# Patient Record
Sex: FEMALE | Race: OTHER | NOT HISPANIC OR LATINO | URBAN - METROPOLITAN AREA
[De-identification: names, ages, dates, MRNs, and addresses within clinical notes are randomized per-mention and may not be internally consistent; named-entity substitution may affect disease eponyms.]

---

## 2017-05-04 ENCOUNTER — APPOINTMENT (RX ONLY)
Dept: URBAN - METROPOLITAN AREA TELEMEDICINE 2 | Facility: TELEMEDICINE | Age: 42
Setting detail: DERMATOLOGY
End: 2017-05-04

## 2017-05-04 DIAGNOSIS — Z41.9 ENCOUNTER FOR PROCEDURE FOR PURPOSES OTHER THAN REMEDYING HEALTH STATE, UNSPECIFIED: ICD-10-CM

## 2017-05-04 PROBLEM — K21.9 GASTRO-ESOPHAGEAL REFLUX DISEASE WITHOUT ESOPHAGITIS: Status: ACTIVE | Noted: 2017-05-04

## 2017-05-04 PROBLEM — J45.909 UNSPECIFIED ASTHMA, UNCOMPLICATED: Status: ACTIVE | Noted: 2017-05-04

## 2017-05-04 PROCEDURE — ? COSMETIC CONSULTATION: BOTULINUM TOXIN

## 2017-05-04 PROCEDURE — ? BOTOX

## 2017-05-04 PROCEDURE — ? OTHER (COSMETIC)

## 2017-05-04 ASSESSMENT — LOCATION ZONE DERM: LOCATION ZONE: FACE

## 2017-05-04 ASSESSMENT — LOCATION SIMPLE DESCRIPTION DERM: LOCATION SIMPLE: LEFT FOREHEAD

## 2017-05-04 ASSESSMENT — LOCATION DETAILED DESCRIPTION DERM
LOCATION DETAILED: LEFT MEDIAL FOREHEAD
LOCATION DETAILED: LEFT INFERIOR MEDIAL FOREHEAD

## 2017-05-04 NOTE — PROCEDURE: BOTOX
Price (Use Numbers Only, No Special Characters Or $): 320
Additional Area 5 Units: 0
Glabellar Complex Units: 16
Post-Care Instructions: Patient instructed to not lie down for 4 hours and limit physical activity for 24 hours. Patient instructed not to travel by airplane for 48 hours.
Additional Area 1 Location: Brows
Forehead Units: 4
Additional Area 2 Location: Lower lids
Detail Level: Detailed
Consent: Written consent obtained. Risks include but not limited to lid/brow ptosis, bruising, swelling, diplopia, temporary effect, incomplete chemical denervation.
Reconstitution Date (Optional): 05/04/2017
Dilution (U/0.1 Cc): 5
Lot #: [id number]
Expiration Date (Month Year): 11/2019

## 2017-05-04 NOTE — PROCEDURE: OTHER (COSMETIC)
Other (Free Text): Went lighter on units per pt request \\n\\nDiscussed avoiding forehead above lateral brows to avoid eyebrow ptosis and avoid further worsening eyelid ptosis \\n\\nDiscussed 16 units to periorbital at follow up
Detail Level: Zone

## 2017-05-19 ENCOUNTER — APPOINTMENT (RX ONLY)
Dept: URBAN - METROPOLITAN AREA TELEMEDICINE 2 | Facility: TELEMEDICINE | Age: 42
Setting detail: DERMATOLOGY
End: 2017-05-19

## 2017-05-19 DIAGNOSIS — Z41.9 ENCOUNTER FOR PROCEDURE FOR PURPOSES OTHER THAN REMEDYING HEALTH STATE, UNSPECIFIED: ICD-10-CM

## 2017-05-19 PROCEDURE — ? BOTOX

## 2017-05-19 PROCEDURE — ? COSMETIC FOLLOW-UP

## 2017-05-19 PROCEDURE — ? COSMETIC CONSULTATION: BOTULINUM TOXIN

## 2017-05-19 PROCEDURE — ? RECOMMENDATIONS

## 2017-05-19 NOTE — PROCEDURE: BOTOX
Lateral Platysmal Bands Units: 0
Consent: Written consent obtained. Risks include but not limited to lid/brow ptosis, bruising, swelling, diplopia, temporary effect, incomplete chemical denervation.
Price (Use Numbers Only, No Special Characters Or $): 208
Detail Level: Detailed
Expiration Date (Month Year): 9/2018
Post-Care Instructions: Patient instructed to not lie down for 4 hours and limit physical activity for 24 hours. Patient instructed not to travel by airplane for 48 hours.
Additional Area 1 Location: Brows
Dilution (U/0.1 Cc): 5
Additional Area 2 Location: Lower lids
Reconstitution Date (Optional): 5/19/17
Periorbital Skin Units: 16

## 2017-05-19 NOTE — PROCEDURE: COSMETIC FOLLOW-UP
Side Effects Or Complications: None
Global Improvement: Marked
Treatment (Optional): Botox
Patient Satisfaction: Very pleased
Detail Level: Zone

## 2017-05-19 NOTE — PROCEDURE: RECOMMENDATIONS
Detail Level: Zone
Recommendation Preamble: The following recommendations were made during the visit: Revox 7

## 2017-10-05 ENCOUNTER — APPOINTMENT (RX ONLY)
Dept: URBAN - METROPOLITAN AREA TELEMEDICINE 2 | Facility: TELEMEDICINE | Age: 42
Setting detail: DERMATOLOGY
End: 2017-10-05

## 2017-10-05 DIAGNOSIS — R55 SYNCOPE AND COLLAPSE: ICD-10-CM

## 2017-10-05 DIAGNOSIS — Z41.9 ENCOUNTER FOR PROCEDURE FOR PURPOSES OTHER THAN REMEDYING HEALTH STATE, UNSPECIFIED: ICD-10-CM

## 2017-10-05 PROCEDURE — ? COSMETIC CONSULTATION: BOTULINUM TOXIN

## 2017-10-05 PROCEDURE — ? OTHER

## 2017-10-05 PROCEDURE — ? COUNSELING

## 2017-10-05 PROCEDURE — ? OTHER (COSMETIC)

## 2017-10-05 PROCEDURE — ? RECOMMENDATIONS

## 2017-10-05 PROCEDURE — ? BOTOX

## 2017-10-05 NOTE — PROCEDURE: OTHER
Detail Level: Generalized
Other (Free Text): Patient put in trendelenburg position \\nCool compress applied on neck and head for 10 minutes\\nPt given orange juice and pretzels (pt declined)\\nGiven 2 bottles of water, advised to hydrate and take it easy rest of the day\\n\\nObserved for an additional 10 minutes, patient states feeling back to normal
Note Text (......Xxx Chief Complaint.): This diagnosis correlates with the

## 2017-10-05 NOTE — PROCEDURE: RECOMMENDATIONS
Recommendations (Free Text): Revision Revox 7
Detail Level: Zone
Recommendation Preamble: The following recommendations were made during the visit:

## 2017-10-05 NOTE — PROCEDURE: BOTOX
Lot #: [id number]
Glabellar Complex Units: 16
Post-Care Instructions: Patient instructed to not lie down for 4 hours and limit physical activity for 24 hours. Patient instructed not to travel by airplane for 48 hours.
Additional Area 2 Location: Lower lids
Additional Area 2 Units: 0
Additional Area 1 Location: Brows
Consent: Written consent obtained. Risks include but not limited to lid/brow ptosis, bruising, swelling, diplopia, temporary effect, incomplete chemical denervation.
Reconstitution Date (Optional): 10/2017
Forehead Units: 4
Price (Use Numbers Only, No Special Characters Or $): 348
Dilution (U/0.1 Cc): 5
Expiration Date (Month Year): 4/2020
Detail Level: Detailed

## 2018-02-16 ENCOUNTER — APPOINTMENT (RX ONLY)
Dept: URBAN - METROPOLITAN AREA TELEMEDICINE 2 | Facility: TELEMEDICINE | Age: 43
Setting detail: DERMATOLOGY
End: 2018-02-16

## 2018-02-16 DIAGNOSIS — Z41.9 ENCOUNTER FOR PROCEDURE FOR PURPOSES OTHER THAN REMEDYING HEALTH STATE, UNSPECIFIED: ICD-10-CM

## 2018-02-16 PROCEDURE — ? OTHER (COSMETIC)

## 2018-02-16 PROCEDURE — ? BOTOX

## 2018-02-16 PROCEDURE — ? COSMETIC CONSULTATION: BOTULINUM TOXIN

## 2018-02-16 ASSESSMENT — LOCATION ZONE DERM: LOCATION ZONE: FACE

## 2018-02-16 ASSESSMENT — LOCATION SIMPLE DESCRIPTION DERM: LOCATION SIMPLE: GLABELLA

## 2018-02-16 ASSESSMENT — LOCATION DETAILED DESCRIPTION DERM: LOCATION DETAILED: GLABELLA

## 2018-02-16 NOTE — PROCEDURE: BOTOX
Detail Level: Detailed
Reconstitution Date (Optional): 02/15/2018 and 02/16/2018
Price (Use Numbers Only, No Special Characters Or $): 010
Anterior Platysmal Bands Units: 0
Additional Area 2 Location: Lower lids
Expiration Date (Month Year): 9/2020
Dilution (U/0.1 Cc): 5
Consent: Written consent obtained. Risks include but not limited to lid/brow ptosis, bruising, swelling, diplopia, temporary effect, incomplete chemical denervation.
Lot #: [id number]
Glabellar Complex Units: 12
Forehead Units: 8
Post-Care Instructions: Patient instructed to not lie down for 4 hours and limit physical activity for 24 hours. Patient instructed not to travel by airplane for 48 hours.
Additional Area 1 Location: Brows

## 2018-02-16 NOTE — PROCEDURE: OTHER (COSMETIC)
Detail Level: Zone
Other (Free Text): Discussed in detail with patient that putting less units in the glabella and more in the forehead could lead to her brows feeling and looking heavy. Patient states understanding but would like to try less units to glabella due to problem area being forehead.\\n\\nPatient will RTC in 3 weeks to have Botox in periorbital, consider adding brows if needed

## 2018-03-09 ENCOUNTER — APPOINTMENT (RX ONLY)
Dept: URBAN - METROPOLITAN AREA TELEMEDICINE 2 | Facility: TELEMEDICINE | Age: 43
Setting detail: DERMATOLOGY
End: 2018-03-09

## 2018-03-09 DIAGNOSIS — Z41.9 ENCOUNTER FOR PROCEDURE FOR PURPOSES OTHER THAN REMEDYING HEALTH STATE, UNSPECIFIED: ICD-10-CM

## 2018-03-09 PROCEDURE — ? BOTOX

## 2018-03-09 PROCEDURE — ? COSMETIC CONSULTATION: BOTULINUM TOXIN

## 2018-03-09 PROCEDURE — ? COSMETIC FOLLOW-UP

## 2018-03-09 ASSESSMENT — LOCATION DETAILED DESCRIPTION DERM
LOCATION DETAILED: LEFT INFERIOR TEMPLE
LOCATION DETAILED: RIGHT INFERIOR TEMPLE
LOCATION DETAILED: SUPERIOR MID FOREHEAD
LOCATION DETAILED: GLABELLA

## 2018-03-09 ASSESSMENT — LOCATION SIMPLE DESCRIPTION DERM
LOCATION SIMPLE: SUPERIOR FOREHEAD
LOCATION SIMPLE: GLABELLA
LOCATION SIMPLE: RIGHT TEMPLE
LOCATION SIMPLE: LEFT TEMPLE

## 2018-03-09 ASSESSMENT — LOCATION ZONE DERM: LOCATION ZONE: FACE

## 2018-03-09 NOTE — PROCEDURE: COSMETIC FOLLOW-UP
Patient Satisfaction: Pleased
Side Effects Or Complications: None
Treatment (Optional): Botox
Detail Level: Zone
Global Improvement: Very Good

## 2018-03-09 NOTE — PROCEDURE: BOTOX
Periorbital Skin Units: 16
Additional Area 2 Location: Lower lids
Additional Area 2 Units: 0
Post-Care Instructions: Patient instructed to not lie down for 4 hours and limit physical activity for 24 hours. Patient instructed not to travel by airplane for 48 hours.
Additional Area 1 Location: Brows
Expiration Date (Month Year): 10/2020
Price (Use Numbers Only, No Special Characters Or $): 208
Detail Level: Detailed
Reconstitution Date (Optional): 3/9/18
Lot #: [id number]
Consent: Written consent obtained. Risks include but not limited to lid/brow ptosis, bruising, swelling, diplopia, temporary effect, incomplete chemical denervation.
Dilution (U/0.1 Cc): 5

## 2018-08-10 ENCOUNTER — APPOINTMENT (RX ONLY)
Dept: URBAN - METROPOLITAN AREA TELEMEDICINE 2 | Facility: TELEMEDICINE | Age: 43
Setting detail: DERMATOLOGY
End: 2018-08-10

## 2018-08-10 DIAGNOSIS — Z41.9 ENCOUNTER FOR PROCEDURE FOR PURPOSES OTHER THAN REMEDYING HEALTH STATE, UNSPECIFIED: ICD-10-CM

## 2018-08-10 PROCEDURE — ? COSMETIC CONSULTATION: BOTULINUM TOXIN

## 2018-08-10 PROCEDURE — ? BOTOX

## 2018-08-10 PROCEDURE — ? OTHER (COSMETIC)

## 2018-08-10 ASSESSMENT — LOCATION DETAILED DESCRIPTION DERM: LOCATION DETAILED: GLABELLA

## 2018-08-10 ASSESSMENT — LOCATION ZONE DERM: LOCATION ZONE: FACE

## 2018-08-10 ASSESSMENT — LOCATION SIMPLE DESCRIPTION DERM: LOCATION SIMPLE: GLABELLA

## 2018-08-10 NOTE — PROCEDURE: BOTOX
Reconstitution Date (Optional): 8/10/18
Forehead Units: 10
Dilution (U/0.1 Cc): 5
Nasal Root Units: 0
Consent: Written consent obtained. Risks include but not limited to lid/brow ptosis, bruising, swelling, diplopia, temporary effect, incomplete chemical denervation.
Additional Area 1 Location: Brows
Detail Level: Detailed
Price (Use Numbers Only, No Special Characters Or $): 467
Additional Area 2 Location: Lower lids
Expiration Date (Month Year): 12/25/2020
Lot #: [id number]
Post-Care Instructions: Patient instructed to not lie down for 4 hours and limit physical activity for 24 hours. Patient instructed not to travel by airplane for 48 hours.

## 2018-08-10 NOTE — PROCEDURE: OTHER (COSMETIC)
Other (Free Text): Discussed in detail with patient that putting less units in the glabella and more in the forehead could lead to her brows feeling and looking heavy. Patient states understanding but would like to try less units to glabella due to problem area being forehead.
Detail Level: Zone
Other (Free Text): Discussed recommended units are glabella 20 and forehead 10, patient defers

## 2018-09-14 ENCOUNTER — APPOINTMENT (RX ONLY)
Dept: URBAN - METROPOLITAN AREA TELEMEDICINE 2 | Facility: TELEMEDICINE | Age: 43
Setting detail: DERMATOLOGY
End: 2018-09-14

## 2018-09-14 DIAGNOSIS — Z41.9 ENCOUNTER FOR PROCEDURE FOR PURPOSES OTHER THAN REMEDYING HEALTH STATE, UNSPECIFIED: ICD-10-CM

## 2018-09-14 PROCEDURE — ? BOTOX

## 2018-09-14 PROCEDURE — ? COSMETIC CONSULTATION: BOTULINUM TOXIN

## 2018-09-14 PROCEDURE — ? RECOMMENDATIONS

## 2018-09-14 PROCEDURE — ? COSMETIC CONSULTATION: FILLERS

## 2018-09-14 NOTE — PROCEDURE: RECOMMENDATIONS
Recommendations (Free Text): ****\\n\\n2 syringes of juvederm Voluma- 1 to each cheek. \\nQuoted $1752 and discussed $100 discount if does both syringes at once
Recommendation Preamble: The following recommendations were made during the visit:
Detail Level: Simple

## 2018-09-14 NOTE — PROCEDURE: BOTOX
Lateral Platysmal Bands Units: 0
Consent: Written consent obtained. Risks include but not limited to lid/brow ptosis, bruising, swelling, diplopia, temporary effect, incomplete chemical denervation.
Price (Use Numbers Only, No Special Characters Or $): 208
Post-Care Instructions: Patient instructed to not lie down for 4 hours and limit physical activity for 24 hours. Patient instructed not to travel by airplane for 48 hours.
Additional Area 2 Location: Lower lids
Periorbital Skin Units: 16
Expiration Date (Month Year): 4/2021
Detail Level: Detailed
Lot #: [id number]
Dilution (U/0.1 Cc): 5
Additional Area 1 Location: Brows

## 2018-12-07 ENCOUNTER — APPOINTMENT (RX ONLY)
Dept: URBAN - METROPOLITAN AREA TELEMEDICINE 2 | Facility: TELEMEDICINE | Age: 43
Setting detail: DERMATOLOGY
End: 2018-12-07

## 2018-12-07 DIAGNOSIS — Z41.9 ENCOUNTER FOR PROCEDURE FOR PURPOSES OTHER THAN REMEDYING HEALTH STATE, UNSPECIFIED: ICD-10-CM

## 2018-12-07 PROCEDURE — ? BOTOX

## 2018-12-07 PROCEDURE — ? COSMETIC CONSULTATION: BOTULINUM TOXIN

## 2018-12-07 PROCEDURE — ? OTHER

## 2018-12-07 ASSESSMENT — LOCATION SIMPLE DESCRIPTION DERM: LOCATION SIMPLE: GLABELLA

## 2018-12-07 ASSESSMENT — LOCATION ZONE DERM: LOCATION ZONE: FACE

## 2018-12-07 ASSESSMENT — LOCATION DETAILED DESCRIPTION DERM: LOCATION DETAILED: GLABELLA

## 2018-12-07 NOTE — PROCEDURE: OTHER
Detail Level: Generalized
Note Text (......Xxx Chief Complaint.): This diagnosis correlates with the
Other (Free Text): Patient purchased at cosmetic holiday party, $9.50/unit
Other (Free Text): Discussed may need to increase glabella units in future

## 2018-12-07 NOTE — PROCEDURE: BOTOX
Price (Use Numbers Only, No Special Characters Or $): 882
Additional Area 3 Units: 0
Reconstitution Date (Optional): 12/6/2018 and 12/7/2018
Periorbital Skin Units: 16
Glabellar Complex Units: 10
Detail Level: Detailed
Expiration Date (Month Year): 05/2021
Additional Area 2 Location: Lower lids
Additional Area 1 Location: Brows
Lot #: [id number]
Post-Care Instructions: Patient instructed to not lie down for 4 hours and limit physical activity for 24 hours. Patient instructed not to travel by airplane for 48 hours.
Consent: Written consent obtained. Risks include but not limited to lid/brow ptosis, bruising, swelling, diplopia, temporary effect, incomplete chemical denervation.
Dilution (U/0.1 Cc): 5
